# Patient Record
Sex: FEMALE | Race: WHITE | NOT HISPANIC OR LATINO | Employment: FULL TIME | URBAN - METROPOLITAN AREA
[De-identification: names, ages, dates, MRNs, and addresses within clinical notes are randomized per-mention and may not be internally consistent; named-entity substitution may affect disease eponyms.]

---

## 2019-08-09 ENCOUNTER — OFFICE VISIT (OUTPATIENT)
Dept: URGENT CARE | Facility: CLINIC | Age: 44
End: 2019-08-09
Payer: COMMERCIAL

## 2019-08-09 VITALS
TEMPERATURE: 98 F | SYSTOLIC BLOOD PRESSURE: 138 MMHG | HEART RATE: 81 BPM | WEIGHT: 145 LBS | RESPIRATION RATE: 20 BRPM | DIASTOLIC BLOOD PRESSURE: 92 MMHG | OXYGEN SATURATION: 100 % | BODY MASS INDEX: 26.68 KG/M2 | HEIGHT: 62 IN
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
## 2019-08-09 DIAGNOSIS — S61.452A DOG BITE, HAND, LEFT, INITIAL ENCOUNTER: Primary | ICD-10-CM

## 2019-08-09 PROCEDURE — 99203 PR OFFICE/OUTPT VISIT, NEW, LEVL III, 30-44 MIN: ICD-10-PCS | Mod: 25,S$GLB,, | Performed by: FAMILY MEDICINE

## 2019-08-09 PROCEDURE — 90715 TDAP VACCINE GREATER THAN OR EQUAL TO 7YO IM: ICD-10-PCS | Mod: S$GLB,,, | Performed by: FAMILY MEDICINE

## 2019-08-09 PROCEDURE — 90471 IMMUNIZATION ADMIN: CPT | Mod: S$GLB,,, | Performed by: FAMILY MEDICINE

## 2019-08-09 PROCEDURE — 99203 OFFICE O/P NEW LOW 30 MIN: CPT | Mod: 25,S$GLB,, | Performed by: FAMILY MEDICINE

## 2019-08-09 PROCEDURE — 90471 PR IMMUNIZ ADMIN,1 SINGLE/COMB VAC/TOXOID: ICD-10-PCS | Mod: S$GLB,,, | Performed by: FAMILY MEDICINE

## 2019-08-09 PROCEDURE — 90715 TDAP VACCINE 7 YRS/> IM: CPT | Mod: S$GLB,,, | Performed by: FAMILY MEDICINE

## 2019-08-09 RX ORDER — DOXYCYCLINE 100 MG/1
100 CAPSULE ORAL 2 TIMES DAILY
Qty: 14 CAPSULE | Refills: 0 | Status: SHIPPED | OUTPATIENT
Start: 2019-08-09 | End: 2019-08-16

## 2019-08-09 RX ORDER — MUPIROCIN 20 MG/G
OINTMENT TOPICAL
Qty: 22 G | Refills: 1 | Status: SHIPPED | OUTPATIENT
Start: 2019-08-09

## 2019-08-09 NOTE — PROGRESS NOTES
"Subjective:       Patient ID: Chantale Ford is a 44 y.o. female.    Vitals:  height is 5' 2" (1.575 m) and weight is 65.8 kg (145 lb). Her oral temperature is 98.3 °F (36.8 °C). Her blood pressure is 138/92 (abnormal) and her pulse is 81. Her respiration is 20 and oxygen saturation is 100%.     Chief Complaint: Animal Bite (Dog Bite to Left Hand)    This is a 44 y.o. female who presents today with a chief complaint of dog bite at work.  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  She has wounds to the left hand and left thumb.  Bleeding under control upon arrival.  Patient reports the dog that bit her is up to date on its vaccines. Her Td status is unknown    Animal Bite    The incident occurred today. The incident occurred at work. She came to the ER via personal transport. There is an injury to the left hand. There is an injury to the left thumb. The pain is mild. It is unknown if a foreign body is present. Pertinent negatives include no abdominal pain, no light-headedness, no loss of consciousness and no weakness. There have been no prior injuries to these areas. She is right-handed. Her tetanus status is out of date. She has been behaving normally.       Constitution: Negative for fatigue.   HENT: Negative for facial swelling and facial trauma.    Neck: Negative for neck stiffness.   Cardiovascular: Negative for chest trauma.   Eyes: Negative for eye trauma, double vision and blurred vision.   Gastrointestinal: Negative for abdominal trauma, abdominal pain and rectal bleeding.   Genitourinary: Negative for hematuria, missed menses, genital trauma and pelvic pain.   Musculoskeletal: Positive for trauma. Negative for joint swelling and abnormal ROM of joint.   Skin: Positive for wound. Negative for color change, abrasion, laceration and bruising.   Neurological: Negative for dizziness, history of vertigo, light-headedness, coordination " disturbances, altered mental status and loss of consciousness.   Hematologic/Lymphatic: Negative for history of bleeding disorder.   Psychiatric/Behavioral: Negative for altered mental status.       Objective:      Physical Exam   Constitutional: She appears well-developed and well-nourished.   HENT:   Head: Normocephalic.   Eyes: Pupils are equal, round, and reactive to light. EOM are normal.   Cardiovascular: Normal rate and regular rhythm.   Skin: Abrasion (abrasion noted DIP thumb) and laceration (1cm crescant shaped puncture wound hypothenal eminence left palm. ) noted.   Nursing note and vitals reviewed.      Assessment:       1. Dog bite, hand, left, initial encounter        Plan:         Dog bite, hand, left, initial encounter  -     (In Office Administered) Tdap Vaccine  -     mupirocin (BACTROBAN) 2 % ointment; Apply to affected area 3 times daily  Dispense: 22 g; Refill: 1  -     doxycycline (VIBRAMYCIN) 100 MG Cap; Take 1 capsule (100 mg total) by mouth 2 (two) times daily. for 7 days  Dispense: 14 capsule; Refill: 0    RTC for signs of infection

## 2019-08-09 NOTE — PATIENT INSTRUCTIONS
Dog Bite  A dog bite can cause a wound deep enough to break the skin. In such cases, the wound is cleaned and sometimes closed. If the wound is closed, it is usually not completely closed. This is so that fluid can drain if the wound becomes infected. Often, wounds will be left open to heal. In addition to wound care, a tetanus shot may be given, if needed.    Home care  · Wash your hands well with soap and warm water before and after caring for the wound. This helps lower the risk of infection.  · Care for the wound as directed. If a dressing was applied to the wound, be sure to change it as directed.  · If the wound bleeds, place a clean, soft cloth on the wound. Then firmly apply pressure until the bleeding stops. This may take up to 5 minutes. Do not release the pressure and look at the wound during this time.  · Most wounds heal within 10 days. But an infection can occur even with proper treatment. So be sure to check the wound daily for signs of infection (see below).  · Antibiotics may be prescribed. These help prevent or treat infection. If youre given antibiotics, take them as directed. Also be sure to complete the medicines.  Rabies prevention  Rabies is a virus that can be carried in certain animals. These can include domestic animals such as dogs and cats. Pets fully vaccinated against rabies (2 shots) are at very low risk of infection. But because human rabies is almost always fatal, any biting pet should be confined for 10 days as an extra precaution. In general, if there is a risk for rabies, the following steps may need to be taken:  · If someones pet dog has bitten you, it should be kept in a secure area for the next 10 days to watch for signs of illness. (If the pet owner wont allow this, contact your local animal control center.) If the dog becomes ill or dies during that time, contact your local animal control center at once so the animal may be tested for rabies. If the dog stays healthy  for the next 10 days, there is no danger of rabies in the animal or you.  ¨ If a stray dog bit you, contact your local animal control center. They can give information on capture, quarantine, and animal rabies testing.  ¨ If you cant find the animal that bit you in the next 2 days, and if rabies exists in your area, you may need to receive the rabies vaccine series. Call your healthcare provider right away. Or, return to the emergency department promptly.  ¨ All animal bites should be reported to the local animal control center. If you were not given a form to fill out, you can report this yourself.  Follow-up care  Follow up with your healthcare provider, or as directed.  When to seek medical advice  Call your healthcare provider right away if any of these occur:  · Signs of infection:  ¨ Spreading redness or warmth from the wound  ¨ Increased pain or swelling  ¨ Fever of 100.4ºF (38ºC) or higher, or as directed by your healthcare provider  ¨ Colored fluid or pus draining from the wound  · Signs of rabies infection:  ¨ Headache  ¨ Confusion  ¨ Strange behavior  ¨ Increased salivating and drooling  ¨ Seizure  · Decreased ability to move any body part near the wound  · Bleeding that can't be stopped after 5 minutes of firm pressure  Date Last Reviewed: 3/1/2017  © 6056-9859 [employer]. [address] [place] [address], [place] [address]. All rights reserved. This information is not intended as a substitute for professional medical care. Always follow your healthcare professional's instructions.

## 2019-11-29 ENCOUNTER — OFFICE VISIT (OUTPATIENT)
Dept: URGENT CARE | Facility: CLINIC | Age: 44
End: 2019-11-29
Payer: COMMERCIAL

## 2019-11-29 VITALS
HEIGHT: 62 IN | TEMPERATURE: 98 F | WEIGHT: 145 LBS | DIASTOLIC BLOOD PRESSURE: 84 MMHG | OXYGEN SATURATION: 99 % | SYSTOLIC BLOOD PRESSURE: 128 MMHG | BODY MASS INDEX: 26.68 KG/M2 | RESPIRATION RATE: 18 BRPM | HEART RATE: 73 BPM
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
## 2019-11-29 PROCEDURE — 99214 PR OFFICE/OUTPT VISIT, EST, LEVL IV, 30-39 MIN: ICD-10-PCS | Mod: S$GLB,,, | Performed by: PHYSICIAN ASSISTANT

## 2019-11-29 PROCEDURE — 99214 OFFICE O/P EST MOD 30 MIN: CPT | Mod: S$GLB,,, | Performed by: PHYSICIAN ASSISTANT

## 2019-11-29 RX ORDER — DOXYCYCLINE HYCLATE 100 MG
100 TABLET ORAL 2 TIMES DAILY
Qty: 14 TABLET | Refills: 0 | Status: SHIPPED | OUTPATIENT
Start: 2019-11-29 | End: 2019-12-06

## 2019-11-29 RX ORDER — MUPIROCIN 20 MG/G
OINTMENT TOPICAL
Qty: 22 G | Refills: 1 | Status: SHIPPED | OUTPATIENT
Start: 2019-11-29

## 2019-11-29 NOTE — PATIENT INSTRUCTIONS
Please take antibiotics to completion  Apply mupirocin to bites and scratches daily  Keep wound clean with mild soap and water daily  Make sure to get cat vaccination records and ensure they are up to date on rabies vaccine  If they are not, you will need to get rabies vaccine in the emergency department  If you have any spreading redness, swelling, irritation, please be re-evaluated  You can return to work tomorrow, if you feel you cannot, please call [phone]      Please return here or go to the Emergency Department for any concerns or worsening of condition.  If you were prescribed antibiotics, please take them to completion.  If you were prescribed a narcotic medication, do not drive or operate heavy equipment or machinery while taking these medications.  Please follow up with your primary care doctor or specialist as needed.    If you  smoke, please stop smoking.    I have put in a referral to occupational health. They should call you within a few days. If they do not call you, the number to call them is [phone]      Cat Bite    A cat bite can cause a wound deep enough to break the skin. In such cases, the wound is cleaned and then sometimes closed. If the wound is closed it is usually not closed completely. This is so that fluid can drain if the wound becomes infected. Often the wound is left open to heal. In addition to wound care, a tetanus shot may be given, if needed.  Home care  · Wash your hands well with soap and warm water before and after caring for the wound. This helps lower the risk of infection.  · Care for the wound as directed. If a dressing was applied to the wound, be sure to change it as directed.  · If the wound bleeds, place a clean, soft cloth on the wound. Then firmly apply pressure until the bleeding stops. This may take up to 5 minutes. Do not release the pressure and look at the wound during this time.  · Always get medical attention for cat bites on the hand. They are  highly likely to become infected.  · Most wounds heal within 10 days. But an infection can occur even with proper treatment. So be sure to check the wound daily for signs of infection (see below).  · Antibiotics may be prescribed. These help prevent or treat infection. If youre given antibiotics, take them as directed. Also be sure to complete the medicines.  Rabies prevention  Rabies is a virus that can be carried in certain animals. These can include domestic animals such as cats and dogs. Pets fully vaccinated against rabies (2 shots) are at very low risk of infection. But because human rabies is almost always fatal, any biting pet should be confined for 10 days as an extra precaution. In general, if there is a risk for rabies, the following steps may need to be taken:  · If someones pet cat has bitten you, it should be kept in a secure area for the next 10 days to watch for signs of illness. (If the pet owner wont allow this, contact your local animal control center.) If the cat becomes ill or dies during that time, contact your local animal control center at once so the animal may be tested for rabies. If the cat stays healthy for the next 10 days, there is no danger of rabies in the animal or you.  · If a stray cat bit you, contact your local animal control center. They can give information on capture, quarantine, and animal rabies testing.  · If you cant find the animal that bit you in the next 2 days, and if rabies exists in your area, you may need to receive the rabies vaccine series. Call your healthcare provider right away. Or return to the emergency department promptly.  · All animal bites should be reported to the local animal control center. If you were not given a form to fill out, you can report this yourself.  Follow-up care  Follow up with your healthcare provider, or as directed.  When to seek medical advice  Call your healthcare provider right away if any of these occur:  · Signs of  infection:  ¨ Spreading redness or warmth from the wound  ¨ Increased pain or swelling  ¨ Fever of 100.4ºF (38ºC) or higher, or as directed by your healthcare provider  ¨ Colored fluid or pus draining from the wound  ¨ Enlarged lymph nodes above the area that was bitten, such as lymph nodes in the armpit if you were bitten on the hand or arm. This may be a sign of cat-scratch disease (cat-scratch fever).  · Signs of rabies infection:  ¨ Headache  ¨ Confusion  ¨ Strange behavior  ¨ Increased salivating or drooling  ¨ Seizure  · Decreased ability to move any body part near the bite area  · Bleeding that can't be stopped after 5 minutes of firm pressure  Date Last Reviewed: 3/23/2015  © 0435-9169 [employer]. [address] [place] [address], [place] [address]. All rights reserved. This information is not intended as a substitute for professional medical care. Always follow your healthcare professional's instructions.

## 2019-11-29 NOTE — PROGRESS NOTES
"Subjective:       Patient ID: Chantale Ford is a 44 y.o. female.    Vitals:  height is 5' 2" (1.575 m) and weight is 65.8 kg (145 lb). Her oral temperature is 97.5 °F (36.4 °C). Her blood pressure is 128/84 and her pulse is 73. Her respiration is 18 and oxygen saturation is 99%.     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 She was giving fluids to a cat when the cat pulled away and bit her. She reports redness and swelling to the area with warmth and pain. No drainage to the area. Denies any fevers, N/V, abdominal pain.    Animal Bite    The incident occurred just prior to arrival. The incident occurred at work. She came to the ER via personal transport. Head/neck injury location: left hand. The pain is moderate. It is unlikely that a foreign body is present. Pertinent negatives include no abdominal pain, no light-headedness, no loss of consciousness and no weakness. There have been prior injuries to these areas. She is ambidexterous. Her tetanus status is UTD. She has been behaving normally. There were no sick contacts. She has received no recent medical care. Services received include medications given.       Constitution: Negative for fatigue.   HENT: Negative for facial swelling and facial trauma.    Neck: Negative for neck stiffness.   Cardiovascular: Negative for chest trauma.   Eyes: Negative for eye trauma, double vision and blurred vision.   Gastrointestinal: Negative for abdominal trauma, abdominal pain and rectal bleeding.   Genitourinary: Negative for hematuria, missed menses, genital trauma and pelvic pain.   Musculoskeletal: Negative for pain, trauma, joint swelling and abnormal ROM of joint.   Skin: Positive for puncture wound. Negative for color change, wound, abrasion, laceration, erythema and bruising.   Neurological: Negative for dizziness, history of vertigo, light-headedness, coordination " disturbances, altered mental status and loss of consciousness.   Hematologic/Lymphatic: Negative for history of bleeding disorder.   Psychiatric/Behavioral: Negative for altered mental status.       Objective:      Physical Exam   Constitutional: She is oriented to person, place, and time. She appears well-developed and well-nourished.   HENT:   Head: Normocephalic and atraumatic. Head is without abrasion, without contusion and without laceration.   Right Ear: External ear normal.   Left Ear: External ear normal.   Nose: Nose normal.   Mouth/Throat: Oropharynx is clear and moist and mucous membranes are normal.   Eyes: Pupils are equal, round, and reactive to light. Conjunctivae, EOM and lids are normal.   Neck: Trachea normal, full passive range of motion without pain and phonation normal. Neck supple.   Cardiovascular: Normal rate, regular rhythm and normal heart sounds.   Pulmonary/Chest: Effort normal and breath sounds normal. No stridor. No respiratory distress.   Musculoskeletal: Normal range of motion.        Hands:  There is surrounding redness and small amount of edema to the circled areas, small amount of warmth   Neurological: She is alert and oriented to person, place, and time.   Skin: Skin is warm, dry and no rash. Capillary refill takes less than 2 seconds. abrasion, burn, bruising, erythema and ecchymosis  Psychiatric: She has a normal mood and affect. Her speech is normal and behavior is normal. Judgment and thought content normal. Cognition and memory are normal.   Nursing note and vitals reviewed.        Assessment:       1. Cat bite, initial encounter        Plan:       Advised to get cat checked for vaccinations/rabies  Tetanus utd  Mupirocin and abx  F/u with [facility]    Cat bite, initial encounter  -     doxycycline (VIBRA-TABS) 100 MG tablet; Take 1 tablet (100 mg total) by mouth 2 (two) times daily. for 7 days  Dispense: 14 tablet; Refill: 0  -     mupirocin (BACTROBAN) 2 % ointment; Apply  to affected area 3 times daily  Dispense: 22 g; Refill: 1  -     Ambulatory referral to Occupational Medicine         Patient Instructions   Please take antibiotics to completion  Apply mupirocin to bites and scratches daily  Keep wound clean with mild soap and water daily  Make sure to get cat vaccination records and ensure they are up to date on rabies vaccine  If they are not, you will need to get rabies vaccine in the emergency department  If you have any spreading redness, swelling, irritation, please be re-evaluated  You can return to work tomorrow, if you feel you cannot, please call [phone]      Please return here or go to the Emergency Department for any concerns or worsening of condition.  If you were prescribed antibiotics, please take them to completion.  If you were prescribed a narcotic medication, do not drive or operate heavy equipment or machinery while taking these medications.  Please follow up with your primary care doctor or specialist as needed.    If you  smoke, please stop smoking.    I have put in a referral to occupational health. They should call you within a few days. If they do not call you, the number to call them is [phone]      Cat Bite    A cat bite can cause a wound deep enough to break the skin. In such cases, the wound is cleaned and then sometimes closed. If the wound is closed it is usually not closed completely. This is so that fluid can drain if the wound becomes infected. Often the wound is left open to heal. In addition to wound care, a tetanus shot may be given, if needed.  Home care  · Wash your hands well with soap and warm water before and after caring for the wound. This helps lower the risk of infection.  · Care for the wound as directed. If a dressing was applied to the wound, be sure to change it as directed.  · If the wound bleeds, place a clean, soft cloth on the wound. Then firmly apply pressure until the bleeding stops. This may take up to 5 minutes.  Do not release the pressure and look at the wound during this time.  · Always get medical attention for cat bites on the hand. They are highly likely to become infected.  · Most wounds heal within 10 days. But an infection can occur even with proper treatment. So be sure to check the wound daily for signs of infection (see below).  · Antibiotics may be prescribed. These help prevent or treat infection. If youre given antibiotics, take them as directed. Also be sure to complete the medicines.  Rabies prevention  Rabies is a virus that can be carried in certain animals. These can include domestic animals such as cats and dogs. Pets fully vaccinated against rabies (2 shots) are at very low risk of infection. But because human rabies is almost always fatal, any biting pet should be confined for 10 days as an extra precaution. In general, if there is a risk for rabies, the following steps may need to be taken:  · If someones pet cat has bitten you, it should be kept in a secure area for the next 10 days to watch for signs of illness. (If the pet owner wont allow this, contact your local animal control center.) If the cat becomes ill or dies during that time, contact your local animal control center at once so the animal may be tested for rabies. If the cat stays healthy for the next 10 days, there is no danger of rabies in the animal or you.  · If a stray cat bit you, contact your local animal control center. They can give information on capture, quarantine, and animal rabies testing.  · If you cant find the animal that bit you in the next 2 days, and if rabies exists in your area, you may need to receive the rabies vaccine series. Call your healthcare provider right away. Or return to the emergency department promptly.  · All animal bites should be reported to the local animal control center. If you were not given a form to fill out, you can report this yourself.  Follow-up care  Follow up with your healthcare  provider, or as directed.  When to seek medical advice  Call your healthcare provider right away if any of these occur:  · Signs of infection:  ¨ Spreading redness or warmth from the wound  ¨ Increased pain or swelling  ¨ Fever of 100.4ºF (38ºC) or higher, or as directed by your healthcare provider  ¨ Colored fluid or pus draining from the wound  ¨ Enlarged lymph nodes above the area that was bitten, such as lymph nodes in the armpit if you were bitten on the hand or arm. This may be a sign of cat-scratch disease (cat-scratch fever).  · Signs of rabies infection:  ¨ Headache  ¨ Confusion  ¨ Strange behavior  ¨ Increased salivating or drooling  ¨ Seizure  · Decreased ability to move any body part near the bite area  · Bleeding that can't be stopped after 5 minutes of firm pressure  Date Last Reviewed: 3/23/2015  © 6510-5577 [employer]. [address] [place] [address], [place] [id number]. All rights reserved. This information is not intended as a substitute for professional medical care. Always follow your healthcare professional's instructions.

## 2019-11-30 ENCOUNTER — OFFICE VISIT (OUTPATIENT)
Dept: URGENT CARE | Facility: CLINIC | Age: 44
End: 2019-11-30
Payer: COMMERCIAL

## 2019-11-30 VITALS
HEIGHT: 62 IN | SYSTOLIC BLOOD PRESSURE: 133 MMHG | TEMPERATURE: 98 F | HEART RATE: 96 BPM | RESPIRATION RATE: 20 BRPM | DIASTOLIC BLOOD PRESSURE: 96 MMHG | WEIGHT: 145 LBS | BODY MASS INDEX: 26.68 KG/M2 | OXYGEN SATURATION: 100 %
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
## 2019-11-30 DIAGNOSIS — S61.452D CAT BITE OF LEFT HAND, SUBSEQUENT ENCOUNTER: Primary | ICD-10-CM

## 2019-11-30 PROCEDURE — 99214 OFFICE O/P EST MOD 30 MIN: CPT | Mod: S$GLB,,, | Performed by: FAMILY MEDICINE

## 2019-11-30 PROCEDURE — 99214 PR OFFICE/OUTPT VISIT, EST, LEVL IV, 30-39 MIN: ICD-10-PCS | Mod: S$GLB,,, | Performed by: FAMILY MEDICINE

## 2019-11-30 PROCEDURE — 96372 PR INJECTION,THERAP/PROPH/DIAG2ST, IM OR SUBCUT: ICD-10-PCS | Mod: S$GLB,,, | Performed by: FAMILY MEDICINE

## 2019-11-30 PROCEDURE — 96372 THER/PROPH/DIAG INJ SC/IM: CPT | Mod: S$GLB,,, | Performed by: FAMILY MEDICINE

## 2019-11-30 RX ORDER — CEFTRIAXONE 1 G/1
1 INJECTION, POWDER, FOR SOLUTION INTRAMUSCULAR; INTRAVENOUS
Status: COMPLETED | OUTPATIENT
Start: 2019-11-30 | End: 2019-11-30

## 2019-11-30 RX ORDER — DEXTROAMPHETAMINE SULFATE 10 MG/1
10 TABLET ORAL
COMMUNITY
Start: 2017-03-28

## 2019-11-30 RX ORDER — HYDROCODONE BITARTRATE AND ACETAMINOPHEN 7.5; 325 MG/1; MG/1
TABLET ORAL
Refills: 0 | COMMUNITY
Start: 2019-11-18

## 2019-11-30 RX ORDER — AMOXICILLIN 875 MG/1
TABLET, FILM COATED ORAL
Refills: 1 | COMMUNITY
Start: 2019-11-18 | End: 2019-11-30 | Stop reason: ALTCHOICE

## 2019-11-30 RX ORDER — FLUCONAZOLE 150 MG/1
150 TABLET ORAL DAILY
Qty: 1 TABLET | Refills: 0 | Status: SHIPPED | OUTPATIENT
Start: 2019-11-30 | End: 2019-12-01

## 2019-11-30 RX ORDER — METHYLPHENIDATE HYDROCHLORIDE 36 MG/1
72 TABLET ORAL
COMMUNITY
Start: 2017-03-28

## 2019-11-30 RX ADMIN — CEFTRIAXONE 1 G: 1 INJECTION, POWDER, FOR SOLUTION INTRAMUSCULAR; INTRAVENOUS at 11:11

## 2019-11-30 NOTE — PATIENT INSTRUCTIONS
Cat Bite    A cat bite can cause a wound deep enough to break the skin. In such cases, the wound is cleaned and then sometimes closed. If the wound is closed it is usually not closed completely. This is so that fluid can drain if the wound becomes infected. Often the wound is left open to heal. In addition to wound care, a tetanus shot may be given, if needed.  Home care  · Wash your hands well with soap and warm water before and after caring for the wound. This helps lower the risk of infection.  · Care for the wound as directed. If a dressing was applied to the wound, be sure to change it as directed.  · If the wound bleeds, place a clean, soft cloth on the wound. Then firmly apply pressure until the bleeding stops. This may take up to 5 minutes. Do not release the pressure and look at the wound during this time.  · Always get medical attention for cat bites on the hand. They are highly likely to become infected.  · Most wounds heal within 10 days. But an infection can occur even with proper treatment. So be sure to check the wound daily for signs of infection (see below).  · Antibiotics may be prescribed. These help prevent or treat infection. If youre given antibiotics, take them as directed. Also be sure to complete the medicines.  Rabies prevention  Rabies is a virus that can be carried in certain animals. These can include domestic animals such as cats and dogs. Pets fully vaccinated against rabies (2 shots) are at very low risk of infection. But because human rabies is almost always fatal, any biting pet should be confined for 10 days as an extra precaution. In general, if there is a risk for rabies, the following steps may need to be taken:  · If someones pet cat has bitten you, it should be kept in a secure area for the next 10 days to watch for signs of illness. (If the pet owner wont allow this, contact your local animal control center.) If the cat becomes ill or dies during that time, contact your  local animal control center at once so the animal may be tested for rabies. If the cat stays healthy for the next 10 days, there is no danger of rabies in the animal or you.  · If a stray cat bit you, contact your local animal control center. They can give information on capture, quarantine, and animal rabies testing.  · If you cant find the animal that bit you in the next 2 days, and if rabies exists in your area, you may need to receive the rabies vaccine series. Call your healthcare provider right away. Or return to the emergency department promptly.  · All animal bites should be reported to the local animal control center. If you were not given a form to fill out, you can report this yourself.  Follow-up care  Follow up with your healthcare provider, or as directed.  When to seek medical advice  Call your healthcare provider right away if any of these occur:  · Signs of infection:  ¨ Spreading redness or warmth from the wound  ¨ Increased pain or swelling  ¨ Fever of 100.4ºF (38ºC) or higher, or as directed by your healthcare provider  ¨ Colored fluid or pus draining from the wound  ¨ Enlarged lymph nodes above the area that was bitten, such as lymph nodes in the armpit if you were bitten on the hand or arm. This may be a sign of cat-scratch disease (cat-scratch fever).  · Signs of rabies infection:  ¨ Headache  ¨ Confusion  ¨ Strange behavior  ¨ Increased salivating or drooling  ¨ Seizure  · Decreased ability to move any body part near the bite area  · Bleeding that can't be stopped after 5 minutes of firm pressure  Date Last Reviewed: 3/23/2015  © 1287-1352 [employer]. [address] [place] [address], [place] [address]. All rights reserved. This information is not intended as a substitute for professional medical care. Always follow your healthcare professional's instructions.

## 2019-11-30 NOTE — PROGRESS NOTES
"Subjective:       Patient ID: Chantale Ford is a 44 y.o. female.    Vitals:  height is 5' 2" (1.575 m) and weight is 65.8 kg (145 lb). Her oral temperature is 98.2 °F (36.8 °C). Her blood pressure is 133/96 (abnormal) and her pulse is 96. Her respiration is 20 and oxygen saturation is 100%.     Chief Complaint: Animal Bite    This is a 44 y.o. female   with No past medical history on file.   and Past Surgical History:  No date: TUBAL LIGATION  who presents today with a chief complaint of a cat bite on her left hand. Her hand is swollen, red and warm to the touch. She took an old prescription of amoxicillin to help relieve her symptoms. She's also been taking the medication she was prescribed a day ago along with betadine.     Animal Bite    The incident occurred yesterday. The incident occurred at work. There is an injury to the left hand. The pain is moderate. Pertinent negatives include no chest pain, no nausea, no vomiting, no headaches, no light-headedness, no weakness and no cough. There have been no prior injuries to these areas. She is right-handed. Her tetanus status is UTD. She has been behaving normally. There were no sick contacts.       Constitution: Negative for chills, fatigue and fever.   HENT: Negative for congestion and sore throat.    Neck: Negative for painful lymph nodes.   Cardiovascular: Negative for chest pain and leg swelling.   Eyes: Negative for double vision and blurred vision.   Respiratory: Negative for cough and shortness of breath.    Gastrointestinal: Negative for nausea, vomiting and diarrhea.   Genitourinary: Negative for dysuria, frequency, urgency and history of kidney stones.   Musculoskeletal: Positive for pain. Negative for joint pain, joint swelling, muscle cramps and muscle ache.   Skin: Positive for puncture wound and erythema (swelling dorsum of left hand to wrist, warmth). Negative for color change, pale, rash and bruising.   Allergic/Immunologic: Negative for " seasonal allergies.   Neurological: Negative for dizziness, history of vertigo, light-headedness, passing out and headaches.   Hematologic/Lymphatic: Negative for swollen lymph nodes.   Psychiatric/Behavioral: Negative for nervous/anxious, sleep disturbance and depression. The patient is not nervous/anxious.        Objective:      Physical Exam   Constitutional: She appears well-developed and well-nourished.   Cardiovascular: Normal rate, regular rhythm and normal heart sounds.   Pulmonary/Chest: Effort normal and breath sounds normal.   Skin: Lesions:  erythema (swelling dorsum of left hand to wrist, warmth)  Nursing note and vitals reviewed.        Assessment:       1. Cat bite of left hand, subsequent encounter      early cellulitis  Plan:         Cat bite of left hand, subsequent encounter  -     cefTRIAXone injection 1 g  -     fluconazole (DIFLUCAN) 150 MG Tab; Take 1 tablet (150 mg total) by mouth once daily. for 1 day  Dispense: 1 tablet; Refill: 0    continue with previously prescribed antibiotics (patient has had only two doses). Stressed elevation and heat. To be re-evaluated in 2 days. RTC prn worsening symptoms

## 2019-11-30 NOTE — LETTER
November 30, 2019      [facility]  [address]  [facility] [address]  Phone: [phone]  Fax: [phone]       Patient: Chantale Ford   YOB: 1975  Date of Visit: 11/30/2019    To Whom It May Concern:    Colin Ford  was at [facility] on 11/30/2019. She may return to work/school on 02/03/2019 with no restrictions. If you have any questions or concerns, or if I can be of further assistance, please do not hesitate to contact me.    Sincerely,              Aiden Wilkinson MD

## 2019-12-04 ENCOUNTER — TELEPHONE (OUTPATIENT)
Dept: URGENT CARE | Facility: CLINIC | Age: 44
End: 2019-12-04

## 2019-12-17 ENCOUNTER — TELEPHONE (OUTPATIENT)
Dept: URGENT CARE | Facility: CLINIC | Age: 44
End: 2019-12-17

## 2019-12-17 NOTE — TELEPHONE ENCOUNTER
Called patient and there was no answer, left a voice message for the patient letting her know that the call is in reference to her injury.

## 2019-12-20 ENCOUNTER — TELEPHONE (OUTPATIENT)
Dept: URGENT CARE | Facility: CLINIC | Age: 44
End: 2019-12-20

## 2019-12-20 NOTE — TELEPHONE ENCOUNTER
Called patient for a follow-up check and no answer, I left a message for the patient @ number listed for contact

## 2021-04-19 ENCOUNTER — OFFICE VISIT (OUTPATIENT)
Dept: URGENT CARE | Facility: CLINIC | Age: 46
End: 2021-04-19
Payer: COMMERCIAL

## 2021-04-19 VITALS
TEMPERATURE: 98 F | BODY MASS INDEX: 26.43 KG/M2 | HEIGHT: 61 IN | HEART RATE: 103 BPM | OXYGEN SATURATION: 97 % | WEIGHT: 140 LBS | SYSTOLIC BLOOD PRESSURE: 154 MMHG | DIASTOLIC BLOOD PRESSURE: 109 MMHG
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
## 2021-04-19 DIAGNOSIS — S61.052A DOG BITE OF LEFT THUMB, INITIAL ENCOUNTER: Primary | ICD-10-CM

## 2021-04-19 PROCEDURE — 99203 OFFICE O/P NEW LOW 30 MIN: CPT | Mod: S$GLB,,, | Performed by: PHYSICIAN ASSISTANT

## 2021-04-19 PROCEDURE — 99203 PR OFFICE/OUTPT VISIT, NEW, LEVL III, 30-44 MIN: ICD-10-PCS | Mod: S$GLB,,, | Performed by: PHYSICIAN ASSISTANT

## 2021-04-19 RX ORDER — AMOXICILLIN AND CLAVULANATE POTASSIUM 875; 125 MG/1; MG/1
1 TABLET, FILM COATED ORAL 2 TIMES DAILY
Qty: 10 TABLET | Refills: 0 | Status: SHIPPED | OUTPATIENT
Start: 2021-04-19 | End: 2021-04-24

## 2023-12-15 DIAGNOSIS — Z12.31 SCREENING MAMMOGRAM, ENCOUNTER FOR: Primary | ICD-10-CM

## 2024-04-08 ENCOUNTER — OFFICE VISIT (OUTPATIENT)
Dept: URGENT CARE | Facility: CLINIC | Age: 49
End: 2024-04-08
Payer: COMMERCIAL

## 2024-04-08 VITALS
RESPIRATION RATE: 16 BRPM | SYSTOLIC BLOOD PRESSURE: 156 MMHG | WEIGHT: 160 LBS | OXYGEN SATURATION: 99 % | HEART RATE: 95 BPM | DIASTOLIC BLOOD PRESSURE: 96 MMHG | TEMPERATURE: 99 F | BODY MASS INDEX: 30.23 KG/M2

## 2024-04-08 DIAGNOSIS — S29.019A THORACIC MYOFASCIAL STRAIN, INITIAL ENCOUNTER: ICD-10-CM

## 2024-04-08 DIAGNOSIS — M25.561 ACUTE PAIN OF RIGHT KNEE: ICD-10-CM

## 2024-04-08 DIAGNOSIS — R03.0 ELEVATED BLOOD PRESSURE READING IN OFFICE WITHOUT DIAGNOSIS OF HYPERTENSION: ICD-10-CM

## 2024-04-08 DIAGNOSIS — S16.1XXA STRAIN OF NECK MUSCLE, INITIAL ENCOUNTER: ICD-10-CM

## 2024-04-08 DIAGNOSIS — S86.911A MUSCLE STRAIN OF RIGHT LOWER LEG, INITIAL ENCOUNTER: ICD-10-CM

## 2024-04-08 DIAGNOSIS — S76.111A QUADRICEPS STRAIN, RIGHT, INITIAL ENCOUNTER: ICD-10-CM
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
## 2024-04-08 PROCEDURE — 96372 THER/PROPH/DIAG INJ SC/IM: CPT | Mod: S$GLB,,, | Performed by: PHYSICIAN ASSISTANT

## 2024-04-08 PROCEDURE — 99214 OFFICE O/P EST MOD 30 MIN: CPT | Mod: 25,S$GLB,, | Performed by: PHYSICIAN ASSISTANT

## 2024-04-08 RX ORDER — ERGOCALCIFEROL 1.25 MG/1
50000 CAPSULE ORAL
COMMUNITY
Start: 2024-03-08

## 2024-04-08 RX ORDER — CYCLOBENZAPRINE HCL 5 MG
5 TABLET ORAL 3 TIMES DAILY PRN
Qty: 21 TABLET | Refills: 0 | Status: SHIPPED | OUTPATIENT
Start: 2024-04-08 | End: 2024-04-15

## 2024-04-08 RX ORDER — CYCLOBENZAPRINE HCL 5 MG
5 TABLET ORAL 3 TIMES DAILY PRN
Qty: 21 EACH | Refills: 0 | Status: SHIPPED | OUTPATIENT
Start: 2024-04-08 | End: 2024-04-08

## 2024-04-08 RX ORDER — NAPROXEN 500 MG/1
500 TABLET ORAL 2 TIMES DAILY WITH MEALS
Qty: 20 TABLET | Refills: 0 | Status: SHIPPED | OUTPATIENT
Start: 2024-04-08 | End: 2024-04-08

## 2024-04-08 RX ORDER — KETOROLAC TROMETHAMINE 30 MG/ML
30 INJECTION, SOLUTION INTRAMUSCULAR; INTRAVENOUS
Status: COMPLETED | OUTPATIENT
Start: 2024-04-08 | End: 2024-04-08

## 2024-04-08 RX ORDER — NAPROXEN 500 MG/1
500 TABLET ORAL 2 TIMES DAILY WITH MEALS
Qty: 20 TABLET | Refills: 0 | Status: SHIPPED | OUTPATIENT
Start: 2024-04-08 | End: 2024-04-18

## 2024-04-08 RX ADMIN — KETOROLAC TROMETHAMINE 30 MG: 30 INJECTION, SOLUTION INTRAMUSCULAR; INTRAVENOUS at 07:04

## 2024-04-08 NOTE — PROGRESS NOTES
Subjective:      Patient ID: Chantale Ford is a 48 y.o. female.    Vitals:  weight is 72.6 kg (160 lb). Her oral temperature is 98.7 °F (37.1 °C). Her blood pressure is 156/96 (abnormal) and her pulse is 95. Her respiration is 16 and oxygen saturation is 99%.     Chief Complaint: Pain    This is a 48 y.o. female who presents today with a chief complaint of pain from injuried from slipping yesterday. Pt complains of neck/back, R knee pain since last night post slipping on grapes on the floor at [facility] in [place]. Pt did not fall on ground, but instead when she slipped, her R leg went forward and her body twisted.  Pt felt burning pain come on rather quickly to right leg. Pain is right distal anterior thigh and right anterolateral lower leg and right lateral knee. No trauma. Pt feels pain in both sides of neck and near shoulder blades and pain across low back that began after the slip as well, both sides, left worse than right. No urinary sx, incontinence, or saddle anesthesia. Pain reproduced with palpation and movements, ROM.        Home tx: elevated knee, Advil (no relief)    PMH: none    Pain  This is a new problem. The current episode started yesterday. The problem occurs constantly. The problem has been gradually worsening. Associated symptoms include arthralgias, myalgias and neck pain. Pertinent negatives include no abdominal pain, chest pain, chills, congestion, coughing, diaphoresis, fatigue, fever, headaches, joint swelling, nausea, rash, sore throat or vomiting. The symptoms are aggravated by bending, twisting, walking and standing.       Constitution: Negative for chills, sweating, fatigue and fever.   HENT:  Negative for congestion and sore throat.    Neck: Positive for neck pain and neck stiffness.   Cardiovascular:  Negative for chest pain, leg swelling and palpitations.   Eyes:  Negative for eye itching, eye pain and eye redness.   Respiratory:  Negative for cough, sputum production and  shortness of breath.    Gastrointestinal:  Negative for abdominal pain, nausea, vomiting and diarrhea.   Genitourinary:  Negative for dysuria, frequency and urgency.   Musculoskeletal:  Positive for pain, joint pain, back pain and muscle ache. Negative for trauma, joint swelling and abnormal ROM of joint.   Skin:  Negative for color change, rash and erythema.   Neurological:  Negative for dizziness, light-headedness, headaches, disorientation, altered mental status and tingling.   Psychiatric/Behavioral:  Negative for altered mental status and disorientation.       Objective:     Physical Exam   Constitutional: She is oriented to person, place, and time. She appears well-developed.  Non-toxic appearance. She does not appear ill. No distress.   HENT:   Head: Normocephalic and atraumatic. Head is without abrasion, without contusion and without laceration.   Ears:   Right Ear: External ear normal.   Left Ear: External ear normal.   Nose: Nose normal.   Mouth/Throat: Oropharynx is clear and moist and mucous membranes are normal.   Eyes: Conjunctivae, EOM and lids are normal. Pupils are equal, round, and reactive to light.   Neck: Trachea normal and phonation normal. Neck supple.      Comments: C spine ROM full flexion, decreased rotation L/R approx 45 degrees, extenson approx 10 degrees.  decreased range of motion present. pain with movement present. No spinous process tenderness present. muscular tenderness present.   Cardiovascular: Normal rate, regular rhythm and normal heart sounds.   Pulmonary/Chest: Effort normal and breath sounds normal. No accessory muscle usage or stridor. No tachypnea and no bradypnea. No respiratory distress. She has no decreased breath sounds. She has no wheezes. She has no rhonchi. She has no rales.   Musculoskeletal:      Cervical back: She exhibits tenderness and spasm. She exhibits no bony tenderness.      Thoracic back: She exhibits tenderness. She exhibits no bony tenderness.       Lumbar back: She exhibits tenderness. She exhibits no bony tenderness.        Back:         Legs:       Comments: R knee full extension, flexion to 90 degrees. No acute skin change.patella midline appropriately mobile.  Full ROM R ankle. Sensation intact. PT 2+. No LE edema. No pain to proximal thigh or hip. BL sh elbows, full Rom 5/5 str.  str 5/5. BUE and BLE sensation intact. Rad pulses 2+   Neurological: She is alert and oriented to person, place, and time.   Skin: Skin is warm, dry, intact, not diaphoretic and no rash. Capillary refill takes less than 2 seconds. No abrasion, No burn, No bruising, No erythema and No ecchymosis   Psychiatric: Her speech is normal and behavior is normal. Judgment and thought content normal.   Nursing note and vitals reviewed.      Assessment:     1. Slipping, tripping and stumbling without falling, unspecified, initial encounter    2. Thoracic myofascial strain, initial encounter    3. Strain of neck muscle, initial encounter    4. Acute pain of right knee    5. Quadriceps strain, right, initial encounter    6. Muscle strain of right lower leg, initial encounter    7. Elevated blood pressure reading in office without diagnosis of hypertension        Plan:       Slipping, tripping and stumbling without falling, unspecified, initial encounter    Thoracic myofascial strain, initial encounter    Strain of neck muscle, initial encounter    Acute pain of right knee    Quadriceps strain, right, initial encounter  -     ketorolac injection 30 mg  -     Discontinue: naproxen (NAPROSYN) 500 MG tablet; Take 1 tablet (500 mg total) by mouth 2 (two) times daily with meals. for 10 days  Dispense: 20 tablet; Refill: 0  -     Discontinue: cyclobenzaprine (FLEXERIL) 5 MG tablet; Take 1 tablet (5 mg total) by mouth 3 (three) times daily as needed for Muscle spasms.  Dispense: 21 each; Refill: 0  -     cyclobenzaprine (FLEXERIL) 5 MG tablet; Take 1 tablet (5 mg total) by mouth 3 (three) times  daily as needed for Muscle spasms.  Dispense: 21 tablet; Refill: 0  -     naproxen (NAPROSYN) 500 MG tablet; Take 1 tablet (500 mg total) by mouth 2 (two) times daily with meals. for 10 days  Dispense: 20 tablet; Refill: 0    Muscle strain of right lower leg, initial encounter    Elevated blood pressure reading in office without diagnosis of hypertension      - Discussed ddx, home care, tx options, and given follow up precautions.  Advised no x ray in clinic, though based on exam, x ray likely not beneficial at this time. With no trauma, and based on exam, unlikely to be bony abnormality to knee. Symptoms likely from soft tissue injury/strain. Follow up PCP in 1-2 weeks if symptoms persist, consider imaging at that time if no improvement, sooner prn new or worsening symptoms.  I have reviewed the patient's chart to view previous visits, labs, and imaging to assess PMH and look for any trends or previous treatments.

## 2024-04-08 NOTE — PATIENT INSTRUCTIONS
- Rest.    - Drink plenty of fluids.      - you received a shot of toradol in clinic today, so do not take naproxen until at least 8 hours from now.     - take naproxen twice a day as prescribed with food to help with pain and inflammation.  Do not take over the counter NSAIDs (aleve, advil, ibuprofen) while taking this medication.  You can take Tylenol as directed along with this medication for pain relief.    -you can take flexeril (cyclobenzaprine)  as prescribed.  Initial dose is 5 mg, may increase to 10 mg if needed. This is a muscle relaxer that can help with tension of the muscles to help reduce pain. This can also help with muscle spasm. This medicine may cause drowsiness.  Do not drive for operate heavy machinery while on this medication.     - No heavy lifting.    - Icing a muscle strain is best for the first 24-48 hours after an injury. After that, low heat to areas of pain for 20 minutes at a time can help.  - Go to the ER for any weakness or sensation loss of the legs, or loss of bowel or bladder control.    - Follow up with your PCP or specialty clinic as directed in the next 1-2 weeks if not improved or as needed.  You can call [phone] to schedule an appointment with the appropriate provider.    - Go to the ER or seek medical attention immediately if you develop new or worsening symptoms.     - You must understand that you have received an Urgent Care treatment only and that you may be released before all of your medical problems are known or treated.   - You, the patient, will arrange for follow up care as instructed.   - If your condition worsens or fails to improve we recommend that you receive another evaluation at the ER immediately or contact your PCP to discuss your concerns or return here.    Elevated Blood Pressure  Your blood pressure was elevated during your visit to the urgent care.  It was not so high that immediate care was needed but it is recommended that you monitor your blood  pressure over the next week or two to make sure that it is not staying elevated.  Please have your blood pressure taken 2-3 times daily at different times of the day.  Write all of those blood pressures down and record the time that they were taken.  Keep all that information and take it with you to see your Primary Care Physician.  If your blood pressure is consistently above 140/90 you will need to follow up with your PCP more quickly

## 2024-06-14 DIAGNOSIS — Z12.31 VISIT FOR SCREENING MAMMOGRAM: Primary | ICD-10-CM
